# Patient Record
Sex: FEMALE | Race: WHITE
[De-identification: names, ages, dates, MRNs, and addresses within clinical notes are randomized per-mention and may not be internally consistent; named-entity substitution may affect disease eponyms.]

---

## 2021-07-02 ENCOUNTER — HOSPITAL ENCOUNTER (OUTPATIENT)
Dept: HOSPITAL 75 - ER | Age: 36
LOS: 1 days | Discharge: HOME | End: 2021-07-03
Attending: SURGERY
Payer: COMMERCIAL

## 2021-07-02 VITALS — DIASTOLIC BLOOD PRESSURE: 75 MMHG | SYSTOLIC BLOOD PRESSURE: 116 MMHG

## 2021-07-02 VITALS — SYSTOLIC BLOOD PRESSURE: 134 MMHG | DIASTOLIC BLOOD PRESSURE: 63 MMHG

## 2021-07-02 VITALS — DIASTOLIC BLOOD PRESSURE: 93 MMHG | SYSTOLIC BLOOD PRESSURE: 143 MMHG

## 2021-07-02 VITALS — DIASTOLIC BLOOD PRESSURE: 73 MMHG | SYSTOLIC BLOOD PRESSURE: 128 MMHG

## 2021-07-02 VITALS — SYSTOLIC BLOOD PRESSURE: 120 MMHG | DIASTOLIC BLOOD PRESSURE: 56 MMHG

## 2021-07-02 VITALS — HEIGHT: 66.93 IN | BODY MASS INDEX: 45.99 KG/M2 | WEIGHT: 293 LBS

## 2021-07-02 VITALS — DIASTOLIC BLOOD PRESSURE: 66 MMHG | SYSTOLIC BLOOD PRESSURE: 133 MMHG

## 2021-07-02 VITALS — DIASTOLIC BLOOD PRESSURE: 51 MMHG | SYSTOLIC BLOOD PRESSURE: 123 MMHG

## 2021-07-02 VITALS — DIASTOLIC BLOOD PRESSURE: 53 MMHG | SYSTOLIC BLOOD PRESSURE: 147 MMHG

## 2021-07-02 DIAGNOSIS — F17.290: ICD-10-CM

## 2021-07-02 DIAGNOSIS — S32.009A: ICD-10-CM

## 2021-07-02 DIAGNOSIS — S52.501A: ICD-10-CM

## 2021-07-02 DIAGNOSIS — Z83.3: ICD-10-CM

## 2021-07-02 DIAGNOSIS — S32.019A: ICD-10-CM

## 2021-07-02 DIAGNOSIS — S82.92XA: ICD-10-CM

## 2021-07-02 DIAGNOSIS — M85.862: ICD-10-CM
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
## 2021-07-02 DIAGNOSIS — S20.219A: ICD-10-CM

## 2021-07-02 DIAGNOSIS — S22.32XA: ICD-10-CM

## 2021-07-02 DIAGNOSIS — M54.9: ICD-10-CM

## 2021-07-02 DIAGNOSIS — S81.812A: Primary | ICD-10-CM

## 2021-07-02 DIAGNOSIS — E66.01: ICD-10-CM

## 2021-07-02 DIAGNOSIS — S81.012A: ICD-10-CM

## 2021-07-02 DIAGNOSIS — Z82.49: ICD-10-CM

## 2021-07-02 DIAGNOSIS — S30.1XXA: ICD-10-CM

## 2021-07-02 DIAGNOSIS — J45.909: ICD-10-CM

## 2021-07-02 LAB
ALBUMIN SERPL-MCNC: 3.6 GM/DL (ref 3.2–4.5)
ALP SERPL-CCNC: 83 U/L (ref 40–136)
ALT SERPL-CCNC: 59 U/L (ref 0–55)
APTT BLD: 24 SEC (ref 24–35)
BARBITURATES UR QL: NEGATIVE
BENZODIAZ UR QL SCN: NEGATIVE
BILIRUB DIRECT SERPL-MCNC: 0.3 MG/DL (ref 0–0.3)
BILIRUB SERPL-MCNC: 0.5 MG/DL (ref 0.1–1)
BUN/CREAT SERPL: 15
CALCIUM SERPL-MCNC: 9 MG/DL (ref 8.5–10.1)
CHLORIDE SERPL-SCNC: 106 MMOL/L (ref 98–107)
CO2 SERPL-SCNC: 24 MMOL/L (ref 21–32)
COCAINE UR QL: NEGATIVE
CREAT SERPL-MCNC: 0.84 MG/DL (ref 0.6–1.3)
D DIMER PPP FEU-MCNC: 2.95 UG/ML (ref 0–0.49)
FIBRINOGEN PPP-MCNC: 567 MG/DL (ref 221–496)
GFR SERPLBLD BASED ON 1.73 SQ M-ARVRAT: > 60 ML/MIN
GLUCOSE SERPL-MCNC: 116 MG/DL (ref 70–105)
HCT VFR BLD CALC: 41 % (ref 35–52)
HGB BLD-MCNC: 12.2 G/DL (ref 11.5–16)
INR PPP: 1.1 (ref 0.8–1.4)
MAGNESIUM SERPL-MCNC: 1.9 MG/DL (ref 1.6–2.4)
MCH RBC QN AUTO: 26 PG (ref 25–34)
MCHC RBC AUTO-ENTMCNC: 30 G/DL (ref 32–36)
MCV RBC AUTO: 86 FL (ref 80–99)
METHADONE UR QL SCN: NEGATIVE
METHAMPHETAMINE SCREEN URINE S: NEGATIVE
OPIATES UR QL SCN: POSITIVE
OXYCODONE UR QL: NEGATIVE
PHOSPHATE SERPL-MCNC: 3 MG/DL (ref 2.3–4.7)
PLATELET # BLD: 288 10^3/UL (ref 130–400)
PMV BLD AUTO: 10.3 FL (ref 9–12.2)
POTASSIUM SERPL-SCNC: 4 MMOL/L (ref 3.6–5)
PROPOXYPH UR QL: NEGATIVE
PROT SERPL-MCNC: 7.4 GM/DL (ref 6.4–8.2)
PROTHROMBIN TIME: 14.1 SEC (ref 12.2–14.7)
SODIUM SERPL-SCNC: 139 MMOL/L (ref 135–145)
TRICYCLICS UR QL SCN: NEGATIVE
WBC # BLD AUTO: 13.4 10^3/UL (ref 4.3–11)

## 2021-07-02 PROCEDURE — 72125 CT NECK SPINE W/O DYE: CPT

## 2021-07-02 PROCEDURE — 85379 FIBRIN DEGRADATION QUANT: CPT

## 2021-07-02 PROCEDURE — 71045 X-RAY EXAM CHEST 1 VIEW: CPT

## 2021-07-02 PROCEDURE — 73590 X-RAY EXAM OF LOWER LEG: CPT

## 2021-07-02 PROCEDURE — 36415 COLL VENOUS BLD VENIPUNCTURE: CPT

## 2021-07-02 PROCEDURE — 72128 CT CHEST SPINE W/O DYE: CPT

## 2021-07-02 PROCEDURE — 99285 EMERGENCY DEPT VISIT HI MDM: CPT

## 2021-07-02 PROCEDURE — 86901 BLOOD TYPING SEROLOGIC RH(D): CPT

## 2021-07-02 PROCEDURE — 86920 COMPATIBILITY TEST SPIN: CPT

## 2021-07-02 PROCEDURE — 73620 X-RAY EXAM OF FOOT: CPT

## 2021-07-02 PROCEDURE — 85027 COMPLETE CBC AUTOMATED: CPT

## 2021-07-02 PROCEDURE — 73110 X-RAY EXAM OF WRIST: CPT

## 2021-07-02 PROCEDURE — 84100 ASSAY OF PHOSPHORUS: CPT

## 2021-07-02 PROCEDURE — 13122 CMPLX RPR S/A/L ADDL 5 CM/>: CPT

## 2021-07-02 PROCEDURE — 73701 CT LOWER EXTREMITY W/DYE: CPT

## 2021-07-02 PROCEDURE — 80320 DRUG SCREEN QUANTALCOHOLS: CPT

## 2021-07-02 PROCEDURE — 84703 CHORIONIC GONADOTROPIN ASSAY: CPT

## 2021-07-02 PROCEDURE — 73100 X-RAY EXAM OF WRIST: CPT

## 2021-07-02 PROCEDURE — 80306 DRUG TEST PRSMV INSTRMNT: CPT

## 2021-07-02 PROCEDURE — 80048 BASIC METABOLIC PNL TOTAL CA: CPT

## 2021-07-02 PROCEDURE — 72131 CT LUMBAR SPINE W/O DYE: CPT

## 2021-07-02 PROCEDURE — 74177 CT ABD & PELVIS W/CONTRAST: CPT

## 2021-07-02 PROCEDURE — 13121 CMPLX RPR S/A/L 2.6-7.5 CM: CPT

## 2021-07-02 PROCEDURE — 80076 HEPATIC FUNCTION PANEL: CPT

## 2021-07-02 PROCEDURE — 85610 PROTHROMBIN TIME: CPT

## 2021-07-02 PROCEDURE — 51702 INSERT TEMP BLADDER CATH: CPT

## 2021-07-02 PROCEDURE — 85384 FIBRINOGEN ACTIVITY: CPT

## 2021-07-02 PROCEDURE — 71260 CT THORAX DX C+: CPT

## 2021-07-02 PROCEDURE — 90715 TDAP VACCINE 7 YRS/> IM: CPT

## 2021-07-02 PROCEDURE — 86900 BLOOD TYPING SEROLOGIC ABO: CPT

## 2021-07-02 PROCEDURE — 83605 ASSAY OF LACTIC ACID: CPT

## 2021-07-02 PROCEDURE — 70450 CT HEAD/BRAIN W/O DYE: CPT

## 2021-07-02 PROCEDURE — 83735 ASSAY OF MAGNESIUM: CPT

## 2021-07-02 PROCEDURE — 85730 THROMBOPLASTIN TIME PARTIAL: CPT

## 2021-07-02 PROCEDURE — 86850 RBC ANTIBODY SCREEN: CPT

## 2021-07-02 RX ADMIN — MORPHINE SULFATE PRN MG: 4 INJECTION, SOLUTION INTRAMUSCULAR; INTRAVENOUS at 22:02

## 2021-07-02 RX ADMIN — SODIUM CHLORIDE, SODIUM LACTATE, POTASSIUM CHLORIDE, AND CALCIUM CHLORIDE SCH MLS/HR: 600; 310; 30; 20 INJECTION, SOLUTION INTRAVENOUS at 18:41
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
## 2021-07-02 NOTE — DIAGNOSTIC IMAGING REPORT
PROCEDURE: CT thoracic and lumbar spine without contrast.



TECHNIQUE: Multiple contiguous axial images were obtained through

the thoracic and lumbar spine without the use of intravenous

contrast. Sagittal and coronal reformations were then performed.

All CT scans use one or more of the following dose optimizing

techniques: automated exposure control, MA and/or KvP adjustment

based on patient size and exam type or iterative reconstruction. 



INDICATION: Back pain.



COMPARISON: CT chest, abdomen and pelvis performed concurrently.



FINDINGS:



Thoracic spine: No acute fracture within the lumbar spine. There

has been acute nondisplaced fracture of the left 12th rib at its

medial aspect. Alignment of the thoracic spine is normal. No

spinal stenosis. Visualized portions of the lungs are clear

outside of some atelectasis.



Lumbar spine: There are acute simple fractures involving the left

first and second transverse processes. No other acute fracture

within the lumbar spine. No high-grade spinal stenosis. Mild

degenerative disc bulging at L4-L5. No sacral fracture. No

concerning abnormality in the retroperitoneum.



IMPRESSION:

1. Acute mildly displaced fractures of the left posterior 12th

rib and left L1 and L2 transverse processes.

2. No fracture of the thoracic or lumbar vertebrae. 



Dictated by: 



  Dictated on workstation # [id number]
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
## 2021-07-02 NOTE — DIAGNOSTIC IMAGING REPORT
CLINICAL HISTORY: Postreduction.



COMPARISON: Right wrist radiograph performed earlier the same

day.



TECHNIQUE: 2 views of the right wrist.



FINDINGS: There is improved alignment of the fracture involving

the distal right radius. Splint has been placed overlying the

right wrist.



IMPRESSION: Improved alignment of the fracture involving the

distal right radius status post reduction.



Dictated by: 



  Dictated on workstation # [id number]
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
## 2021-07-02 NOTE — ANESTHESIA-GENERAL POST-OP
General


Patient Condition


Mental Status/LOC:  Same as Preop


Cardiovascular:  Satisfactory


Nausea/Vomiting:  Absent


Respiratory:  Satisfactory


Pain:  Controlled


Complications:  Absent





Post Op Complications


Complications


None





Follow Up Care/Instructions


Patient Instructions


None needed.





Anesthesia/Patient Condition


Patient Condition


Patient is doing well, no complaints, stable vital signs, no apparent adverse 

anesthesia problems.   


No complications reported per nursing.











OMARI DUMAS CRNA             Jul 2, 2021 19:42
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
## 2021-07-02 NOTE — PROGRESS NOTE-POST OPERATIVE
Post-Operative Progess Note


Surgeon (s)/Assistant (s)


Surgeon


ROSENDO MUNGUIA DO


Assistant:  MIHIR Abbott





Pre-Operative Diagnosis


Left leg laceration





Post-Operative Diagnosis





same plus avulsion of bone





Procedure & Operative Findings


Date of Procedure


7/2/21


Procedure Performed/Findings


Washout and closure of left leg laceration, measuring 21 cm long with 23 sutures


Anesthesia Type


GET





Estimated Blood Loss


Estimated blood loss (mL):  less than 40 ml





Specimens/Packing


Specimens Removed


loose bony fragment











ROSENDO MUNGUIA DO                Jul 2, 2021 18:34

## 2021-07-03 VITALS — SYSTOLIC BLOOD PRESSURE: 117 MMHG | DIASTOLIC BLOOD PRESSURE: 73 MMHG

## 2021-07-03 VITALS — DIASTOLIC BLOOD PRESSURE: 68 MMHG | SYSTOLIC BLOOD PRESSURE: 155 MMHG

## 2021-07-03 VITALS — SYSTOLIC BLOOD PRESSURE: 130 MMHG | DIASTOLIC BLOOD PRESSURE: 60 MMHG

## 2021-07-03 RX ADMIN — MORPHINE SULFATE PRN MG: 4 INJECTION, SOLUTION INTRAMUSCULAR; INTRAVENOUS at 09:18

## 2021-07-03 RX ADMIN — MORPHINE SULFATE PRN MG: 4 INJECTION, SOLUTION INTRAMUSCULAR; INTRAVENOUS at 11:13

## 2021-07-03 RX ADMIN — CEFAZOLIN SODIUM SCH MLS/HR: 2 SOLUTION INTRAVENOUS at 00:46

## 2021-07-03 RX ADMIN — MORPHINE SULFATE PRN MG: 4 INJECTION, SOLUTION INTRAMUSCULAR; INTRAVENOUS at 13:58

## 2021-07-03 RX ADMIN — MORPHINE SULFATE PRN MG: 4 INJECTION, SOLUTION INTRAMUSCULAR; INTRAVENOUS at 07:44

## 2021-07-03 RX ADMIN — MORPHINE SULFATE PRN MG: 4 INJECTION, SOLUTION INTRAMUSCULAR; INTRAVENOUS at 15:28

## 2021-07-03 RX ADMIN — CEFAZOLIN SODIUM SCH MLS/HR: 2 SOLUTION INTRAVENOUS at 07:45

## 2021-07-03 RX ADMIN — MORPHINE SULFATE PRN MG: 4 INJECTION, SOLUTION INTRAMUSCULAR; INTRAVENOUS at 00:35

## 2021-07-03 RX ADMIN — SODIUM CHLORIDE, SODIUM LACTATE, POTASSIUM CHLORIDE, AND CALCIUM CHLORIDE SCH MLS/HR: 600; 310; 30; 20 INJECTION, SOLUTION INTRAVENOUS at 03:00

## 2021-07-03 RX ADMIN — SODIUM CHLORIDE, SODIUM LACTATE, POTASSIUM CHLORIDE, AND CALCIUM CHLORIDE SCH MLS/HR: 600; 310; 30; 20 INJECTION, SOLUTION INTRAVENOUS at 11:15

## 2021-07-03 RX ADMIN — MORPHINE SULFATE PRN MG: 4 INJECTION, SOLUTION INTRAMUSCULAR; INTRAVENOUS at 05:01
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
## 2021-07-03 NOTE — PROGRESS NOTE - SURGERY
Subjective


Time Seen by a Provider:  10:24


Subjective/Events-last exam


Pt seen and examined, she just got back from CT.  She is sitting up in chair and

nurse states she transferred herself.  Pt states leg and wrist hurt, but not 

that bad and she wants to


go home today.


Review of Systems


General:  No Chills, No Night Sweats


Pulmonary:  No Dyspnea, No Cough


Cardiovascular:  No: Chest Pain, Palpitations


Gastrointestinal:  No: Nausea, Vomiting, Abdominal Pain


Musculoskeletal:  arm pain, leg pain





Focused Exam


Lactate Level


7/2/21 14:16: Lactic Acid Level 1.07





Objective


Exam





Vital Signs








  Date Time  Temp Pulse Resp B/P (MAP) Pulse Ox O2 Delivery O2 Flow Rate FiO2


 


7/3/21 08:31 36.2 84 19 130/60 (83) 96 Room Air  


 


7/3/21 08:10      Room Air  


 


7/3/21 03:54 35.6 84 20 117/73 (88) 91 Room Air  


 


7/2/21 23:54 35.9 84 20 116/75 (89) 92 Room Air  


 


7/2/21 20:00      Room Air  


 


7/2/21 19:55 36.6 97 20 133/66 (88) 94 Room Air  


 


7/2/21 19:42      Room Air  


 


7/2/21 19:30      Room Air  


 


7/2/21 19:30 36.4  14 143/93 (110) 93 Room Air  


 


7/2/21 19:20   14 134/63 (86) 100 Nasal Cannula 2 


 


7/2/21 19:10   14 123/51 (75) 100 Nasal Cannula 2 


 


7/2/21 19:10      Nasal Cannula 2 


 


7/2/21 19:00   14 128/73 (91) 100 Nasal Cannula 2 


 


7/2/21 18:55      Nasal Cannula 2 


 


7/2/21 18:51   18 120/56 (77) 100 Nasal Cannula 2 


 


7/2/21 18:41 37  22 147/53 (84) 100 OxyMask 6 


 


7/2/21 18:41      OxyMask 6 


 


7/2/21 17:30 36.7 108 20 108/63 98 Nasal Cannula 2.00 


 


7/2/21 14:30 37.2 97 22 127/78 (94) 97   














I & O 


 


 7/3/21





 07:00


 


Intake Total 1830 ml


 


Output Total 1325 ml


 


Balance 505 ml





Capillary Refill : Less Than 3 Seconds


General Appearance:  No Apparent Distress, Obese (super morbidly)


Respiratory:  Chest Non Tender, Lungs Clear, Normal Breath Sounds, No Accessory 

Muscle Use, No Respiratory Distress


Cardiovascular:  Regular Rate, Rhythm


Peripheral Pulses:  2+ Dorsalis Pedis (R), 2+ Left Dors-Pedis (L)


Gastrointestinal:  normal bowel sounds, soft


Extremity:  Other (right wrist in splint, left leg wrapped with bandage)


Neurologic/Psychiatric:  Alert, Oriented x3





Results


Lab


Laboratory Tests


7/2/21 14:16: 


White Blood Count 13.4H, Red Blood Count 4.73, Hemoglobin 12.2, Hematocrit 41, 

Mean Corpuscular Volume 86, Mean Corpuscular Hemoglobin 26, Mean Corpuscular 

Hemoglobin Concent 30L, Red Cell Distribution Width 16.1H, Platelet Count 288, 

Mean Platelet Volume 10.3, Prothrombin Time 14.1, INR Comment 1.1, Activated 

Partial Thromboplast Time 24, Fibrinogen 567H, D-Dimer 2.95H, Sodium Level 139, 

Potassium Level 4.0, Chloride Level 106, Carbon Dioxide Level 24, Anion Gap 9, 

Blood Urea Nitrogen 13, Creatinine 0.84, Estimat Glomerular Filtration Rate > 

60, BUN/Creatinine Ratio 15, Glucose Level 116H, Lactic Acid Level 1.07, Calcium

Level 9.0, Phosphorus Level 3.0, Magnesium Level 1.9, Total Bilirubin 0.5, 

Direct Bilirubin 0.3, Indirect Bilirubin 0.2, Aspartate Amino Transf (AST/SGOT) 

84H, Alanine Aminotransferase (ALT/SGPT) 59H, Alkaline Phosphatase 83, Total 

Protein 7.4, Albumin 3.6, Serum Pregnancy Test, Qualitative NEGATIVE, Serum 

Alcohol < 10


7/2/21 15:14: 


Urine Opiates Screen POSITIVEH, Urine Oxycodone Screen NEGATIVE, Urine Methadone

Screen NEGATIVE, Urine Propoxyphene Screen NEGATIVE, Urine Barbiturates Screen 

NEGATIVE, Ur Tricyclic Antidepressants Screen NEGATIVE, Urine Phencyclidine 

Screen NEGATIVE, Urine Amphetamines Screen NEGATIVE, Urine Methamphetamines 

Screen NEGATIVE, Urine Benzodiazepines Screen NEGATIVE, Urine Cocaine Screen 

NEGATIVE, Urine Cannabinoids Screen NEGATIVE





Assessment/Plan


Left leg Laceration


Left 12 rib fx


L1 transverse process fx


Right wrist fracture





PT went to OR for washout of left leg laceration and closure and ER physician 

splinted her right wrist while she was in OR.  Post reduction films look good.  

I ordered CT of her leg today because I found a loose bony fragment.  I went 

over 


CT films myself and believe that it came of medial aspect of femur.  There does 

not appear to be a lot of bleeding in the area, but I am concerned that the 

patella may have been torn out of place.  I will wait for radiology reading and 

then


call Ortho for some advice.  If ortho says she is ok will send her home and have

her f/u with myself and ortho as an outpt.











ROSENDO MUNGUIA DO                Jul 3, 2021 10:39

## 2021-07-03 NOTE — DISCHARGE INST-SURGICAL
Discharge Inst-Surgical


Depart Medication/Instructions


New, Converted or Re-Newed RX:  Transmitted to Pharmacy


Patient Instructions


Follow up Appt:


Make appointment for Tuesday or Thursday. [phone]





Instructions:


No lifting greater than 20 pounds.


No strenuous activity. 


May shower in 24 hours, no tub bath or soaking.


Use incentive spirometer at home as directed.


No Smoking





Skin/Wound Care:


May remove bandages in am.  You need to leave the sutures in place and come in 

to have them removed.


You must wear the sling on right arm until you follow up with Orthopedic 

surgery, you must wear the left knee immobilizer until you see Ortho. 





Symptoms to Report:


Appetite Changes, Extremity Discoloration, Numbness/Tingling, Swelling 

Increased, Bleeding Excessive, Eyesight Changes, Pain Increased, Urine Color 

Change, Constipation(Persistent), Fever over 101 degree F, Pain/Pressure in 

chest, Urinating Difficulty, Cough Up/Vomit Blood, Heart Beat Irreg/Pounding, 

Pain/Pressure in jaw, Cramps in feet or legs, Lightheadedness, Pain/Pressure in 

shoulder, Diarrhea(Persistent), Memory Changes Suddenly, Questions/Concerns, 

Weight gain consecutive days, Dizziness/Fainting, Nausea/Vomiting, Shortness of 

Breath, Weight gain over 2 pounds








If questions or concerns contact your physician 


Or seek help at emergency department.





Activity


Walking Assistive Device:  Crutches (if needed, can weight bear on leg)


Activity Instructions:  Avoid Pulling & Pushing


Driving Instructions:  No Driving/Refer to Dr. Hardy


Discharge Diet:  No Restrictions


If Any Problems/Questions/Issu:  Contact Your Physician, Go to Emergency Room





Skin/Wound Care


Infection Signs and Symptoms:  Increased Redness, Foul Odor of Wound, Increased 

Drainage, Skin Itchy or Has a Rash, Increased Swelling, Temperature Above 101  F


Bathing Instructions:  Shower


Stitches/Staples/Dermabond Dis:  Care of ROSENDO Bradley DO                Jul 3, 2021 15:13